# Patient Record
Sex: FEMALE | Race: WHITE | NOT HISPANIC OR LATINO | URBAN - METROPOLITAN AREA
[De-identification: names, ages, dates, MRNs, and addresses within clinical notes are randomized per-mention and may not be internally consistent; named-entity substitution may affect disease eponyms.]

---

## 2017-11-15 ENCOUNTER — AMBULATORY SURGICAL CENTER (OUTPATIENT)
Dept: URBAN - METROPOLITAN AREA SURGERY 3 | Facility: SURGERY | Age: 67
End: 2017-11-15
Payer: COMMERCIAL

## 2017-11-15 ENCOUNTER — OFFICE (OUTPATIENT)
Dept: URBAN - METROPOLITAN AREA CLINIC 11 | Facility: CLINIC | Age: 67
End: 2017-11-15
Payer: COMMERCIAL

## 2017-11-15 VITALS
DIASTOLIC BLOOD PRESSURE: 56 MMHG | HEIGHT: 62 IN | RESPIRATION RATE: 18 BRPM | RESPIRATION RATE: 18 BRPM | SYSTOLIC BLOOD PRESSURE: 138 MMHG | WEIGHT: 189 LBS | HEART RATE: 92 BPM | HEART RATE: 90 BPM | OXYGEN SATURATION: 93 % | RESPIRATION RATE: 16 BRPM | DIASTOLIC BLOOD PRESSURE: 58 MMHG | SYSTOLIC BLOOD PRESSURE: 114 MMHG | HEIGHT: 62 IN | TEMPERATURE: 96.2 F | SYSTOLIC BLOOD PRESSURE: 105 MMHG | TEMPERATURE: 97.5 F | SYSTOLIC BLOOD PRESSURE: 79 MMHG | DIASTOLIC BLOOD PRESSURE: 59 MMHG | SYSTOLIC BLOOD PRESSURE: 138 MMHG | DIASTOLIC BLOOD PRESSURE: 72 MMHG | SYSTOLIC BLOOD PRESSURE: 106 MMHG | HEART RATE: 75 BPM | OXYGEN SATURATION: 97 % | DIASTOLIC BLOOD PRESSURE: 72 MMHG | OXYGEN SATURATION: 96 % | HEART RATE: 92 BPM | RESPIRATION RATE: 16 BRPM | OXYGEN SATURATION: 92 % | DIASTOLIC BLOOD PRESSURE: 56 MMHG | DIASTOLIC BLOOD PRESSURE: 59 MMHG | SYSTOLIC BLOOD PRESSURE: 106 MMHG | DIASTOLIC BLOOD PRESSURE: 68 MMHG | SYSTOLIC BLOOD PRESSURE: 79 MMHG | HEART RATE: 90 BPM | OXYGEN SATURATION: 92 % | RESPIRATION RATE: 20 BRPM | SYSTOLIC BLOOD PRESSURE: 95 MMHG | OXYGEN SATURATION: 94 % | OXYGEN SATURATION: 97 % | HEART RATE: 76 BPM | OXYGEN SATURATION: 95 % | DIASTOLIC BLOOD PRESSURE: 75 MMHG | HEART RATE: 84 BPM | OXYGEN SATURATION: 95 % | DIASTOLIC BLOOD PRESSURE: 70 MMHG | SYSTOLIC BLOOD PRESSURE: 114 MMHG | TEMPERATURE: 97.5 F | OXYGEN SATURATION: 94 % | WEIGHT: 189 LBS | HEART RATE: 87 BPM | HEART RATE: 75 BPM | HEART RATE: 87 BPM | DIASTOLIC BLOOD PRESSURE: 70 MMHG | HEART RATE: 76 BPM | RESPIRATION RATE: 20 BRPM | HEART RATE: 84 BPM | OXYGEN SATURATION: 96 % | DIASTOLIC BLOOD PRESSURE: 75 MMHG | DIASTOLIC BLOOD PRESSURE: 68 MMHG | SYSTOLIC BLOOD PRESSURE: 105 MMHG | SYSTOLIC BLOOD PRESSURE: 95 MMHG | DIASTOLIC BLOOD PRESSURE: 58 MMHG | TEMPERATURE: 96.2 F | OXYGEN SATURATION: 93 %

## 2017-11-15 DIAGNOSIS — K44.9 DIAPHRAGMATIC HERNIA WITHOUT OBSTRUCTION OR GANGRENE: ICD-10-CM

## 2017-11-15 DIAGNOSIS — K21.9 GASTRO-ESOPHAGEAL REFLUX DISEASE WITHOUT ESOPHAGITIS: ICD-10-CM

## 2017-11-15 DIAGNOSIS — K63.89 OTHER SPECIFIED DISEASES OF INTESTINE: ICD-10-CM

## 2017-11-15 DIAGNOSIS — Z86.010 PERSONAL HISTORY OF COLONIC POLYPS: ICD-10-CM

## 2017-11-15 DIAGNOSIS — K62.1 RECTAL POLYP: ICD-10-CM

## 2017-11-15 PROCEDURE — 45385 COLONOSCOPY W/LESION REMOVAL: CPT | Mod: 33

## 2017-11-15 PROCEDURE — 88305 TISSUE EXAM BY PATHOLOGIST: CPT

## 2017-11-15 PROCEDURE — 43235 EGD DIAGNOSTIC BRUSH WASH: CPT

## 2017-11-15 NOTE — SERVICEHPINOTES
Here for colonoscopic surveillance of personal history of colonic neoplasia- 16 mm SSA. Also had chronic reflux and previous chest and abdominal radiation for NHL.

## 2021-06-03 ENCOUNTER — OFFICE (OUTPATIENT)
Dept: URBAN - METROPOLITAN AREA CLINIC 11 | Facility: CLINIC | Age: 71
End: 2021-06-03

## 2021-06-03 VITALS
SYSTOLIC BLOOD PRESSURE: 169 MMHG | WEIGHT: 204 LBS | HEART RATE: 86 BPM | HEIGHT: 62 IN | OXYGEN SATURATION: 95 % | DIASTOLIC BLOOD PRESSURE: 79 MMHG

## 2021-06-03 DIAGNOSIS — D64.9 ANEMIA, UNSPECIFIED: ICD-10-CM

## 2021-06-03 DIAGNOSIS — K62.5 HEMORRHAGE OF ANUS AND RECTUM: ICD-10-CM

## 2021-06-03 DIAGNOSIS — K21.9 GASTRO-ESOPHAGEAL REFLUX DISEASE WITHOUT ESOPHAGITIS: ICD-10-CM

## 2021-06-03 DIAGNOSIS — R49.0 DYSPHONIA: ICD-10-CM

## 2021-06-03 LAB
FERRITIN, SERUM: 102 NG/ML (ref 15–150)
IRON AND TIBC: IRON BIND.CAP.(TIBC): 391 UG/DL (ref 250–450)
IRON AND TIBC: IRON SATURATION: 16 % (ref 15–55)
IRON AND TIBC: IRON: 63 UG/DL (ref 27–139)
IRON AND TIBC: UIBC: 328 UG/DL (ref 118–369)

## 2021-06-03 PROCEDURE — 99204 OFFICE O/P NEW MOD 45 MIN: CPT

## 2021-06-24 ENCOUNTER — AMBULATORY SURGICAL CENTER (OUTPATIENT)
Dept: URBAN - METROPOLITAN AREA SURGERY 3 | Facility: SURGERY | Age: 71
End: 2021-06-24

## 2021-06-24 ENCOUNTER — OFFICE (OUTPATIENT)
Dept: URBAN - METROPOLITAN AREA PATHOLOGY 22 | Facility: PATHOLOGY | Age: 71
End: 2021-06-24

## 2021-06-24 ENCOUNTER — AMBULATORY SURGICAL CENTER (OUTPATIENT)
Dept: URBAN - METROPOLITAN AREA SURGERY 3 | Facility: SURGERY | Age: 71
End: 2021-06-24
Payer: MEDICARE

## 2021-06-24 VITALS
RESPIRATION RATE: 16 BRPM | TEMPERATURE: 97.2 F | DIASTOLIC BLOOD PRESSURE: 67 MMHG | TEMPERATURE: 97.2 F | SYSTOLIC BLOOD PRESSURE: 149 MMHG | DIASTOLIC BLOOD PRESSURE: 74 MMHG | RESPIRATION RATE: 16 BRPM | RESPIRATION RATE: 18 BRPM | DIASTOLIC BLOOD PRESSURE: 80 MMHG | OXYGEN SATURATION: 97 % | RESPIRATION RATE: 21 BRPM | SYSTOLIC BLOOD PRESSURE: 158 MMHG | OXYGEN SATURATION: 95 % | RESPIRATION RATE: 18 BRPM | HEART RATE: 75 BPM | SYSTOLIC BLOOD PRESSURE: 138 MMHG | SYSTOLIC BLOOD PRESSURE: 147 MMHG | RESPIRATION RATE: 22 BRPM | SYSTOLIC BLOOD PRESSURE: 147 MMHG | WEIGHT: 197 LBS | SYSTOLIC BLOOD PRESSURE: 149 MMHG | OXYGEN SATURATION: 94 % | WEIGHT: 197 LBS | RESPIRATION RATE: 20 BRPM | TEMPERATURE: 97.2 F | SYSTOLIC BLOOD PRESSURE: 158 MMHG | SYSTOLIC BLOOD PRESSURE: 138 MMHG | SYSTOLIC BLOOD PRESSURE: 147 MMHG | HEART RATE: 77 BPM | DIASTOLIC BLOOD PRESSURE: 74 MMHG | HEART RATE: 84 BPM | OXYGEN SATURATION: 95 % | SYSTOLIC BLOOD PRESSURE: 139 MMHG | RESPIRATION RATE: 21 BRPM | OXYGEN SATURATION: 97 % | RESPIRATION RATE: 20 BRPM | SYSTOLIC BLOOD PRESSURE: 139 MMHG | OXYGEN SATURATION: 94 % | HEART RATE: 84 BPM | HEART RATE: 75 BPM | DIASTOLIC BLOOD PRESSURE: 67 MMHG | DIASTOLIC BLOOD PRESSURE: 74 MMHG | RESPIRATION RATE: 16 BRPM | RESPIRATION RATE: 21 BRPM | HEIGHT: 62 IN | TEMPERATURE: 97.9 F | DIASTOLIC BLOOD PRESSURE: 80 MMHG | HEART RATE: 80 BPM | SYSTOLIC BLOOD PRESSURE: 158 MMHG | SYSTOLIC BLOOD PRESSURE: 138 MMHG | RESPIRATION RATE: 22 BRPM | SYSTOLIC BLOOD PRESSURE: 139 MMHG | RESPIRATION RATE: 18 BRPM | HEART RATE: 77 BPM | HEART RATE: 80 BPM | HEART RATE: 80 BPM | TEMPERATURE: 97.9 F | DIASTOLIC BLOOD PRESSURE: 80 MMHG | HEIGHT: 62 IN | WEIGHT: 197 LBS | TEMPERATURE: 97.9 F | HEART RATE: 75 BPM | DIASTOLIC BLOOD PRESSURE: 72 MMHG | RESPIRATION RATE: 20 BRPM | DIASTOLIC BLOOD PRESSURE: 72 MMHG | RESPIRATION RATE: 22 BRPM | OXYGEN SATURATION: 95 % | DIASTOLIC BLOOD PRESSURE: 67 MMHG | OXYGEN SATURATION: 94 % | DIASTOLIC BLOOD PRESSURE: 72 MMHG | HEART RATE: 84 BPM | SYSTOLIC BLOOD PRESSURE: 149 MMHG | OXYGEN SATURATION: 97 % | HEART RATE: 77 BPM | HEIGHT: 62 IN

## 2021-06-24 DIAGNOSIS — K29.50 UNSPECIFIED CHRONIC GASTRITIS WITHOUT BLEEDING: ICD-10-CM

## 2021-06-24 DIAGNOSIS — K21.9 GASTRO-ESOPHAGEAL REFLUX DISEASE WITHOUT ESOPHAGITIS: ICD-10-CM

## 2021-06-24 DIAGNOSIS — D50.9 IRON DEFICIENCY ANEMIA, UNSPECIFIED: ICD-10-CM

## 2021-06-24 DIAGNOSIS — K44.9 DIAPHRAGMATIC HERNIA WITHOUT OBSTRUCTION OR GANGRENE: ICD-10-CM

## 2021-06-24 DIAGNOSIS — K92.1 MELENA: ICD-10-CM

## 2021-06-24 PROBLEM — D53.9 UNEXPLAINED IRON DEFICIENCY ANEMIA: Status: ACTIVE | Noted: 2021-06-24

## 2021-06-24 PROBLEM — K31.89 OTHER DISEASES OF STOMACH AND DUODENUM: Status: ACTIVE | Noted: 2021-06-24

## 2021-06-24 PROCEDURE — 45378 DIAGNOSTIC COLONOSCOPY: CPT

## 2021-06-24 PROCEDURE — 43239 EGD BIOPSY SINGLE/MULTIPLE: CPT | Mod: 51

## 2021-06-24 PROCEDURE — G8907 PT DOC NO EVENTS ON DISCHARG: HCPCS

## 2021-06-24 PROCEDURE — 88313 SPECIAL STAINS GROUP 2: CPT

## 2021-06-24 PROCEDURE — 88305 TISSUE EXAM BY PATHOLOGIST: CPT

## 2021-06-24 PROCEDURE — G8918 PT W/O PREOP ORDER IV AB PRO: HCPCS

## 2021-06-24 PROCEDURE — 88342 IMHCHEM/IMCYTCHM 1ST ANTB: CPT | Mod: 59

## 2021-06-24 NOTE — SERVICEHPINOTES
The patient has recently been found to have new onset anemia. Ferritin was normal iron and iron saturation not available. She denies melena But does admit to occasional bright red blood per rectum.BRShe complains of increasing pyrosis with occasional regurgitation and chronic coarseness. She also has a diffuse upper abdominal pain that occurs postprandially. This is similar in nature to that experience with previous ulcer disease. She does admit to occasional nocturnal coughing but no wign aspiration.

## 2021-06-24 NOTE — SERVICEHPINOTES
The patient has recently been found to have new onset anemia. Ferritin was normal iron and iron saturation not available. She denies melena But does admit to occasional bright red blood per rectum.BRShe complains of increasing pyrosis with occasional regurgitation and chronic coarseness. She also has a diffuse upper abdominal pain that occurs postprandially. This is similar in nature to that experience with previous ulcer disease. She does admit to occasional nocturnal coughing but no wing aspiration.

## 2023-06-16 ENCOUNTER — OFFICE (OUTPATIENT)
Dept: URBAN - METROPOLITAN AREA CLINIC 11 | Facility: CLINIC | Age: 73
End: 2023-06-16

## 2023-06-16 VITALS
OXYGEN SATURATION: 98 % | SYSTOLIC BLOOD PRESSURE: 183 MMHG | HEART RATE: 76 BPM | WEIGHT: 197 LBS | DIASTOLIC BLOOD PRESSURE: 90 MMHG | HEIGHT: 62 IN

## 2023-06-16 DIAGNOSIS — R10.11 RIGHT UPPER QUADRANT PAIN: ICD-10-CM

## 2023-06-16 DIAGNOSIS — D01.2 CARCINOMA IN SITU OF RECTUM: ICD-10-CM

## 2023-06-16 DIAGNOSIS — R19.4 CHANGE IN BOWEL HABIT: ICD-10-CM

## 2023-06-16 PROCEDURE — 99214 OFFICE O/P EST MOD 30 MIN: CPT | Performed by: INTERNAL MEDICINE

## 2023-06-16 RX ORDER — DICYCLOMINE HYDROCHLORIDE 20 MG/1
TABLET ORAL
Qty: 120 | Refills: 0 | Status: ACTIVE

## 2023-06-26 LAB
GIARDIA, EIA, OVA/PARASITE: GIARDIA LAMBLIA AG, EIA: NEGATIVE
GIARDIA, EIA, OVA/PARASITE: OVA + PARASITE EXAM: (no result)
GIARDIA, EIA, OVA/PARASITE: RESULT 1: (no result)

## 2025-02-03 ENCOUNTER — OFFICE (OUTPATIENT)
Dept: URBAN - METROPOLITAN AREA CLINIC 11 | Facility: CLINIC | Age: 75
End: 2025-02-03
Payer: MEDICARE

## 2025-02-03 VITALS
DIASTOLIC BLOOD PRESSURE: 71 MMHG | SYSTOLIC BLOOD PRESSURE: 130 MMHG | HEIGHT: 62 IN | HEART RATE: 94 BPM | OXYGEN SATURATION: 96 % | WEIGHT: 213 LBS

## 2025-02-03 DIAGNOSIS — K21.9 GASTRO-ESOPHAGEAL REFLUX DISEASE WITHOUT ESOPHAGITIS: ICD-10-CM

## 2025-02-03 DIAGNOSIS — D01.2 CARCINOMA IN SITU OF RECTUM: ICD-10-CM

## 2025-02-03 PROCEDURE — 99213 OFFICE O/P EST LOW 20 MIN: CPT

## 2025-02-03 RX ORDER — DICYCLOMINE HYDROCHLORIDE 20 MG/1
TABLET ORAL
Qty: 270 | Refills: 3 | Status: ACTIVE

## 2025-02-03 NOTE — SERVICENOTES
patient presents for follow-up needing a refill of her dicyclomine.  She uses dicyclomine to help with surgical site scar tissue abdominal pain as well as helping  slow her bowel movements down so she does not experience any incontinence episodes.  She has a previously used it twice daily and is requesting an increase to 3 times daily so I will send in that new refill.  Counseled her that dicyclomine used  frequently every day can cause some constipation so she may need to use some MiraLax if she experiences that.   She has good control over her bowel function with adjusting her fiber intake.  No hematochezia.  She can continue using 20 mg Nexium daily since it provides good GERD symptom control.  She does occasionally use it twice daily if she eats spicy food.  No dysphagia.  We will have her follow-up in 1 year sooner if needed.

## 2025-02-03 NOTE — SERVICEHPINOTES
Oriana Payne   is a  74   year old   White  female   with a PMH significant for   arthritis, colon polyps, diabetes, GERD, hiatal hernia, hyperlipidemia, hypertension, breast cancer, non-Hodgkin's lymphoma, rectal cancer, carpal tunnel and right bundle branch block who presents to [facility] for evaluation of  abdominal pain  . 

br Clinic Visit 6/16/2023 with Dr. Sanabria:
[id number] female who underwent colon cancer surgery in 1987 at which time she reports that her anal sphincter muscle was removed (? rendered ineffective). Since that time she has had difficulty controlling bowel movements until she was placed on tramadol and this allowed her stools to be firm enough to not spontaneously leak. About 6 months ago the tramadol.stopped Being as effective and her stools became soft and difficult to control. The symptoms have been accelerated over the last 4 weeks without any known cause or risk factor. Patient is reporting significant postprandial right upper quadrant pain followed by a stooling with some diminution in the discomfort until her next meal.brShe has undergone laboratory studies as well as ultrasonography and CT scan which has failed to reveal any etiology for her discomfort or change in bowel habits. ( report of laboratory and imaging studies reviewed.) 
br   Clinic Visit   2/3/2025   :
brThe patient presents for a checkup and medication management refill. She experiences abdominal pain due to scar tissue and has a history of fecal incontinence since 1987, following cancer surgeries. She manages her symptoms with dicyclomine and tramadol. Dicyclomine has been effective in reducing bowel movement frequency and preventing fecal incontinence. She inquires about increasing the dicyclomine dosage from twice daily to three times daily, particularly when consuming more fiber. She currently takes dicyclomine before breakfast and dinner. Her bowel movements vary with diet, with increased fiber leading to up to three bowel movements a day, which are sometimes soft and yellow. She avoids beef and fat to manage her symptoms, as beef tends to constipate her slightly. No blood or black powder in stool. She has a history of non-Hodgkin's lymphoma, right breast cancer, and colon cancer. She had a colonoscopy in 2021 which revealed internal hemorrhoids and her recall is set for 2026. She undergoes regular PET scans for cancer monitoring. She experiences acid reflux and manages it with Nexium 20 mg daily, which she finds effective. She avoids spicy foods to control symptoms and has no nausea, vomiting, or trouble swallowing. She takes tramadol for pain management and Celebrex as needed for inflammation if she works in her garden. 
liane rob

## 2025-06-13 ENCOUNTER — OFFICE (OUTPATIENT)
Dept: URBAN - METROPOLITAN AREA CLINIC 11 | Facility: CLINIC | Age: 75
End: 2025-06-13
Payer: MEDICARE

## 2025-06-13 VITALS
OXYGEN SATURATION: 92 % | DIASTOLIC BLOOD PRESSURE: 69 MMHG | WEIGHT: 207 LBS | HEIGHT: 62 IN | HEART RATE: 74 BPM | SYSTOLIC BLOOD PRESSURE: 136 MMHG

## 2025-06-13 DIAGNOSIS — K21.9 GASTRO-ESOPHAGEAL REFLUX DISEASE WITHOUT ESOPHAGITIS: ICD-10-CM

## 2025-06-13 DIAGNOSIS — R10.11 RIGHT UPPER QUADRANT PAIN: ICD-10-CM

## 2025-06-13 DIAGNOSIS — K62.5 HEMORRHAGE OF ANUS AND RECTUM: ICD-10-CM

## 2025-06-13 DIAGNOSIS — R13.10 DYSPHAGIA, UNSPECIFIED: ICD-10-CM

## 2025-06-13 DIAGNOSIS — Z91.81 HISTORY OF FALLING: ICD-10-CM

## 2025-06-13 PROCEDURE — 99214 OFFICE O/P EST MOD 30 MIN: CPT

## 2025-06-13 RX ORDER — HYDROCORTISONE ACETATE 25 MG/1
SUPPOSITORY RECTAL
Qty: 10 | Refills: 1 | Status: ACTIVE
Start: 2025-06-13

## 2025-06-13 RX ORDER — SODIUM PICOSULFATE, MAGNESIUM OXIDE, AND ANHYDROUS CITRIC ACID 12; 3.5; 1 G/175ML; G/175ML; MG/175ML
LIQUID ORAL
Qty: 1 | Refills: 0 | Status: ACTIVE
Start: 2025-06-13

## 2025-06-13 NOTE — SERVICEHPINOTES
Oriana Payne is a 74 year old White female with a PMH significant for arthritis, colon polyps, diabetes, GERD, hiatal hernia, hyperlipidemia, hypertension, breast cancer, non-Hodgkin's lymphoma, rectal cancer, carpal tunnel and right bundle branch block who initially presented to [facility] for evaluation of abdominal pain . Clinic Visit 6/16/2023 with Dr. Sanabria:[id number] female who underwent colon cancer surgery in 1987 at which time she reports that her anal sphincter muscle was removed (? rendered ineffective). Since that time she has had difficulty controlling bowel movements until she was placed on tramadol and this allowed her stools to be firm enough to not spontaneously leak. About 6 months ago the tramadol.stopped Being as effective and her stools became soft and difficult to control. The symptoms have been accelerated over the last 4 weeks without any known cause or risk factor. Patient is reporting significant postprandial right upper quadrant pain followed by a stooling with some diminution in the discomfort until her next meal.brShe has undergone laboratory studies as well as ultrasonography and CT scan which has failed to reveal any etiology for her discomfort or change in bowel habits. ( report of laboratory and imaging studies reviewed.)Clinic Visit 2/3/2025:brThe patient presents for a checkup and medication management refill. She experiences abdominal pain due to scar tissue and has a history of fecal incontinence since 1987, following cancer surgeries. She manages her symptoms with dicyclomine and tramadol. Dicyclomine has been effective in reducing bowel movement frequency and preventing fecal incontinence. She inquires about increasing the dicyclomine dosage from twice daily to three times daily, particularly when consuming more fiber. She currently takes dicyclomine before breakfast and dinner. Her bowel movements vary with diet, with increased fiber leading to up to three bowel movements a day, which are sometimes soft and yellow. She avoids beef and fat to manage her symptoms, as beef tends to constipate her slightly. No blood or black powder in stool. She has a history of non-Hodgkin's lymphoma, right breast cancer, and colon cancer. She had a colonoscopy in 2021 which revealed internal hemorrhoids and her recall is set for 2026. She undergoes regular PET scans for cancer monitoring. She experiences acid reflux and manages it with Nexium 20 mg daily, which she finds effective. She avoids spicy foods to control symptoms and has no nausea, vomiting, or trouble swallowing. She takes tramadol for pain management and Celebrex as needed for inflammation if she works in her garden. 
liane rob   Clinic visit 6/13/2025:
lianeThe patient reports rectal bleeding began after a fall on Wednesday, described as bright red and initially occurring every 15 minutes, then tapering to every hour. It was accompanied by watery liquid but no stool. Over time, the frequency of bleeding has decreased, and the color has become pinker. She has not been eating much since the fall, consuming only small amounts of food like a turkey sandwich and drinking water. Right-sided abdominal pain started after the fall, exacerbated by deep breaths but not relieved by bowel movements. No nausea or vomiting is reported. She has a history of internal hemorrhoids with previous rectal bleeding. A colonoscopy in 2021 showed internal hemorrhoids but no polyps. She has had multiple colonoscopies due to a history of polyps and rectal cancer found in 2017. She uses dicyclomine twice daily for abdominal pain but it is not helping the new right sided abdominal pain. She takes Nexium 20 mg daily for reflux which works well for her, but she does still endorse occasional dysphagia. She uses Celebrex as needed for muscle pain. 
liane

## 2025-06-13 NOTE — SERVICENOTES
patient reports that she fell when moving her garbage cans 2 days ago and is not sure if she pulled something but she is now having significant right upper quadrant pain.  I will order a CT to further evaluate.  She also reports that since the fall she has been having rectal bleeding.  she reports the episodes have been significant and that sometimes she will pass blood out of her rectum without any stool. will check blood counts today.   Told her if that she continues to have significant rectal bleeding she needs to go to the ER.  Also told her that if she continues to have significant right-sided abdominal pain she needs to go to the ER. She does have history of internal hemorrhoids.  I will treat her with Anusol suppositories in case the  inflammation with her internal hemorrhoids has contributed to the bleeding but will get her scheduled for colonoscopy to further evaluate.   She requests an EGD at the same time of the colonoscopy due to her chronic GERD so I will order that as well.  She does use Nexium which she can continue to do.  She reports occasional dysphagia so that can be evaluated during the EGD. She does have history of rectal cancer and she is very concerned that a potential polyp is causing the bleeding.  Encouraged her to eat a high-fiber diet.  Counseled her on NSAID use.  Risks and benefits of the procedure explained and she agrees to proceed.  All questions addressed.

## 2025-06-19 ENCOUNTER — OFFICE (OUTPATIENT)
Dept: URBAN - METROPOLITAN AREA CLINIC 22 | Facility: CLINIC | Age: 75
End: 2025-06-19
Payer: MEDICARE

## 2025-06-19 DIAGNOSIS — R13.10 DYSPHAGIA, UNSPECIFIED: ICD-10-CM

## 2025-06-19 DIAGNOSIS — K62.5 HEMORRHAGE OF ANUS AND RECTUM: ICD-10-CM

## 2025-06-19 DIAGNOSIS — D01.2 CARCINOMA IN SITU OF RECTUM: ICD-10-CM

## 2025-06-19 DIAGNOSIS — K21.9 GASTRO-ESOPHAGEAL REFLUX DISEASE WITHOUT ESOPHAGITIS: ICD-10-CM

## 2025-06-19 PROCEDURE — 74177 CT ABD & PELVIS W/CONTRAST: CPT | Mod: TC
